# Patient Record
Sex: FEMALE | Race: BLACK OR AFRICAN AMERICAN
[De-identification: names, ages, dates, MRNs, and addresses within clinical notes are randomized per-mention and may not be internally consistent; named-entity substitution may affect disease eponyms.]

---

## 2020-01-30 ENCOUNTER — HOSPITAL ENCOUNTER (EMERGENCY)
Dept: HOSPITAL 62 - ER | Age: 55
Discharge: HOME | End: 2020-01-30
Payer: OTHER GOVERNMENT

## 2020-01-30 VITALS — DIASTOLIC BLOOD PRESSURE: 88 MMHG | SYSTOLIC BLOOD PRESSURE: 132 MMHG

## 2020-01-30 DIAGNOSIS — M54.42: Primary | ICD-10-CM

## 2020-01-30 PROCEDURE — 99283 EMERGENCY DEPT VISIT LOW MDM: CPT

## 2020-01-30 PROCEDURE — 96372 THER/PROPH/DIAG INJ SC/IM: CPT

## 2020-01-30 NOTE — ER DOCUMENT REPORT
HPI





- HPI


Patient complains to provider of: Low back pain radiating down the left leg


Time Seen by Provider: 01/30/20 12:30


Pain Level: 4


Notes: 





54-year-old female to the emergency department with complaints of low back pain 

has been radiating down her leg since earlier today.  She states she is she has 

a history of low back pain and sciatica but is been sometime since she had a 

flare.  She states that she was walking up the steps into her home this evening 

when she suddenly developed pain with radiculopathy.  She denies any bladder or 

bowel incontinence, saddle paresthesia, fevers, IV drug use.  She states that 

she is in pain management for her neck.  She states that she takes Botox 

injections and steroid injections.  She does not use any narcotic pain medicine.

 She states that [facility]

[facility].





- ROS


Systems Reviewed and Negative: Yes All other systems reviewed and negative





- CONSTITUTIONAL


Constitutional: DENIES: Fever, Chills





- EENT


EENT: DENIES: Sore Throat, Ear Pain, Nasal Drainage-Clear, Nasal Drainage-

Purulent, Congestion, Eye problems





- NEURO


Neurology: DENIES: Headache





- CARDIOVASCULAR


Cardiovascular: DENIES: Chest pain





- RESPIRATORY


Respiratory: DENIES: Trouble Breathing, Coughing





- GASTROINTESTINAL


Gastrointestinal: DENIES: Abdominal Pain, Nausea, Patient vomiting, Diarrhea





- REPRODUCTIVE


Reproductive: DENIES: Pregnant:





- MUSCULOSKELETAL


Musculoskeletal: REPORTS: Extremity pain, Back Pain.  DENIES: Neck Pain





- DERM


Skin Color: Normal


Skin Problems: None





Past Medical History





- General


Information source: Patient





- Social History


Smoking Status: Never Smoker


Chew tobacco use (# tins/day): No


Frequency of alcohol use: Occasional


Drug Abuse: None


Lives with: Spouse/Significant other


Family History: Reviewed & Not Pertinent


Patient has suicidal ideation: No


Patient has homicidal ideation: No





Vertical Provider Document





- CONSTITUTIONAL


Agree With Documented VS: Yes


Exam Limitations: No Limitations


General Appearance: WD/WN, No Apparent Distress





- INFECTION CONTROL


TRAVEL OUTSIDE OF THE [place] IN LAST 30 DAYS: No





- HEENT


HEENT: Atraumatic, Normal ENT Exam, Normocephalic, PERRLA





- NECK


Neck: Normal Inspection, Supple





- RESPIRATORY


Respiratory: Breath Sounds Normal, No Respiratory Distress.  negative: Rales, 

Rhonchi, Wheezing





- CARDIOVASCULAR


Cardiovascular: Regular Rate, Regular Rhythm, No Murmur





- GI/ABDOMEN


Gastrointestinal: Abdomen Soft, Abdomen Non-Tender, No Organomegaly





- BACK


Notes: 





There is tenderness to palpation over the midline lumbar spine and bilateral 

lumbosacral areas.  Negative straight leg raise bilaterally.  Nontender to 

palpation over the midline cervical and thoracic spine.  There is no step-off or

deformity.





- MUSCULOSKELETAL/EXTREMETIES


Musculoskeletal/Extremeties: FROM, Non-Tender





- NEURO


Level of Consciousness: Awake, Alert


Motor/Sensory: No Motor Deficit, No Sensory Deficit, No Pronator Drift





- DERM


Integumentary: Warm, Dry, No Rash





Course





- Re-evaluation


Re-evalutation: 





02/01/20 


Impression: Acute flare of chronic sciatica.  Will send patient home with pain 

medicines and muscle relaxants.  We will have her follow-up with her primary 

care physician.  Patient agrees with the plan.








- Vital Signs


Vital signs: 


                                        











Temp Pulse Resp BP Pulse Ox


 


 98.1 F   88   16   132/88 H  97 


 


 01/30/20 12:00  01/30/20 12:00  01/30/20 12:00  01/30/20 12:00 01/30/20 12:00














Discharge





- Discharge


Clinical Impression: 


Sciatica


Qualifiers:


 Laterality: left Qualified Code(s): M54.32 - Sciatica, left side





Low back pain


Qualifiers:


 Chronicity: acute Back pain laterality: midline Sciatica presence: with 

sciatica Sciatica laterality: sciatica of left side Qualified Code(s): M54.42 - 

Lumbago with sciatica, left side





Condition: Stable


Disposition: HOME, SELF-CARE


Instructions:  Ice Packs ([facility]), Sciatica ([facility])


Additional Instructions: 


[facility].  Take medicine as prescribed.  Return if 

any worsening symptoms such as loss of bladder or bowel control, fevers, 

numbness and tingling around rectum or vagina.  Push fluids.  Gentle stretching.


Prescriptions: 


Cyclobenzaprine HCl [Flexeril 10 mg Tablet] 10 mg PO TID #20 tablet


Methylprednisolone [Medrol Dosepack (4 mg/Tab) 21 Tab/Dosepak] 4 mg PO ASDIR PRN

#21 tab.ds.pk


 PRN Reason: 


Hydrocodone/Acetaminophen [Norco 5-325 mg Tablet] 1 tab PO Q6H #6 tablet


Referrals: 


[facility] [[facility]] - Follow up as needed

## 2020-07-10 ENCOUNTER — HOSPITAL ENCOUNTER (EMERGENCY)
Dept: HOSPITAL 62 - ER | Age: 55
Discharge: HOME | End: 2020-07-10
Payer: OTHER GOVERNMENT

## 2020-07-10 VITALS — SYSTOLIC BLOOD PRESSURE: 124 MMHG | DIASTOLIC BLOOD PRESSURE: 82 MMHG

## 2020-07-10 DIAGNOSIS — J01.00: Primary | ICD-10-CM

## 2020-07-10 DIAGNOSIS — R42: ICD-10-CM

## 2020-07-10 DIAGNOSIS — Z88.2: ICD-10-CM

## 2020-07-10 DIAGNOSIS — Z88.0: ICD-10-CM

## 2020-07-10 LAB
ADD MANUAL DIFF: NO
ALBUMIN SERPL-MCNC: 4.4 G/DL (ref 3.5–5)
ALP SERPL-CCNC: 78 U/L (ref 38–126)
ANION GAP SERPL CALC-SCNC: 8 MMOL/L (ref 5–19)
APPEARANCE UR: CLEAR
APTT PPP: YELLOW S
AST SERPL-CCNC: 25 U/L (ref 14–36)
BASOPHILS # BLD AUTO: 0 10^3/UL (ref 0–0.2)
BASOPHILS NFR BLD AUTO: 0.5 % (ref 0–2)
BILIRUB DIRECT SERPL-MCNC: 0 MG/DL (ref 0–0.4)
BILIRUB SERPL-MCNC: 0.2 MG/DL (ref 0.2–1.3)
BILIRUB UR QL STRIP: NEGATIVE
BUN SERPL-MCNC: 15 MG/DL (ref 7–20)
CALCIUM: 9.6 MG/DL (ref 8.4–10.2)
CHLORIDE SERPL-SCNC: 108 MMOL/L (ref 98–107)
CO2 SERPL-SCNC: 24 MMOL/L (ref 22–30)
EOSINOPHIL # BLD AUTO: 0.1 10^3/UL (ref 0–0.6)
EOSINOPHIL NFR BLD AUTO: 3.5 % (ref 0–6)
ERYTHROCYTE [DISTWIDTH] IN BLOOD BY AUTOMATED COUNT: 13.1 % (ref 11.5–14)
GLUCOSE SERPL-MCNC: 96 MG/DL (ref 75–110)
GLUCOSE UR STRIP-MCNC: NEGATIVE MG/DL
HCT VFR BLD CALC: 38.7 % (ref 36–47)
HGB BLD-MCNC: 12.7 G/DL (ref 12–15.5)
KETONES UR STRIP-MCNC: NEGATIVE MG/DL
LYMPHOCYTES # BLD AUTO: 1.3 10^3/UL (ref 0.5–4.7)
LYMPHOCYTES NFR BLD AUTO: 36.5 % (ref 13–45)
MCH RBC QN AUTO: 26.3 PG (ref 27–33.4)
MCHC RBC AUTO-ENTMCNC: 32.8 G/DL (ref 32–36)
MCV RBC AUTO: 80 FL (ref 80–97)
MONOCYTES # BLD AUTO: 0.3 10^3/UL (ref 0.1–1.4)
MONOCYTES NFR BLD AUTO: 9.6 % (ref 3–13)
NEUTROPHILS # BLD AUTO: 1.8 10^3/UL (ref 1.7–8.2)
NEUTS SEG NFR BLD AUTO: 49.9 % (ref 42–78)
NITRITE UR QL STRIP: NEGATIVE
PH UR STRIP: 5 [PH] (ref 5–9)
PLATELET # BLD: 266 10^3/UL (ref 150–450)
POTASSIUM SERPL-SCNC: 4.3 MMOL/L (ref 3.6–5)
PROT SERPL-MCNC: 7.6 G/DL (ref 6.3–8.2)
PROT UR STRIP-MCNC: NEGATIVE MG/DL
RBC # BLD AUTO: 4.84 10^6/UL (ref 3.72–5.28)
SP GR UR STRIP: 1.03
TOTAL CELLS COUNTED % (AUTO): 100 %
UROBILINOGEN UR-MCNC: NEGATIVE MG/DL (ref ?–2)
WBC # BLD AUTO: 3.6 10^3/UL (ref 4–10.5)

## 2020-07-10 PROCEDURE — 85025 COMPLETE CBC W/AUTO DIFF WBC: CPT

## 2020-07-10 PROCEDURE — 70450 CT HEAD/BRAIN W/O DYE: CPT

## 2020-07-10 PROCEDURE — 81001 URINALYSIS AUTO W/SCOPE: CPT

## 2020-07-10 PROCEDURE — 80053 COMPREHEN METABOLIC PANEL: CPT

## 2020-07-10 PROCEDURE — 99284 EMERGENCY DEPT VISIT MOD MDM: CPT

## 2020-07-10 PROCEDURE — 36415 COLL VENOUS BLD VENIPUNCTURE: CPT

## 2020-07-10 NOTE — ER DOCUMENT REPORT
ED Dizziness/Weakness





- General


Chief Complaint: Dizziness


Stated Complaint: DIZZINESS


Time Seen by Provider: 07/10/20 16:54


Primary Care Provider: 


[facility] [[facility]] - Follow up as needed


Information source: Patient


TRAVEL OUTSIDE OF THE [place] IN LAST 30 DAYS: No





- HPI


Patient complains to provider of: Vertigo - This is a 55-year-old female who is 

had some intermittent vertigo for about 6 weeks which is gotten much worse over 

the last couple of days.  She called her doctor to be seen he advised her to 

come to the hospital since it was Friday evening and she would not build to be 

seen until Monday.  She has no nausea vomiting she is awake alert oriented to 

person place time and event.  She has no neurological deficits..  No: Altered 

mental status





- Related Data


Allergies/Adverse Reactions: 


                                        





amoxicillin Allergy (Verified 07/10/20 16:55)


   


baclofen Allergy (Verified 07/10/20 16:55)


   


Sulfa (Sulfonamide Antibiotics) Allergy (Verified 07/10/20 16:55)


   


sumatriptan Allergy (Verified 07/10/20 16:55)


   











Past Medical History





- General


Information source: Patient





- Social History


Smoking Status: Never Smoker


Chew tobacco use (# tins/day): No


Frequency of alcohol use: None


Drug Abuse: None


Family History: Reviewed & Not Pertinent


Patient has homicidal ideation: No





Physical Exam





- Vital signs


Vitals: 





                                        











Temp Pulse Resp BP Pulse Ox


 


 99.1 F   108 H  18   124/82   100 


 


 07/10/20 16:52  07/10/20 16:52  07/10/20 16:52  07/10/20 16:52  07/10/20 16:52











Interpretation: Normal





- General


General appearance: Appears well, Alert





- HEENT


Head: Normocephalic, Atraumatic


Eyes: Normal


Pupils: PERRL





- Respiratory


Respiratory status: No respiratory distress


Chest status: Nontender


Breath sounds: Normal


Chest palpation: Normal





- Cardiovascular


Rhythm: Regular


Heart sounds: Normal auscultation


Murmur: No





- Abdominal


Inspection: Normal


Distension: No distension


Bowel sounds: Normal


Tenderness: Nontender


Organomegaly: No organomegaly





- Back


Back: Normal, Nontender





- Extremities


General upper extremity: Normal inspection, Nontender, Normal color, Normal ROM,

Normal temperature


General lower extremity: Normal inspection, Nontender, Normal color, Normal ROM,

Normal temperature, Normal weight bearing.  No: Balbir's sign





- Neurological


Neuro grossly intact: Yes


Cognition: Normal


Orientation: AAOx4


[place] Coma Scale Eye Opening: Spontaneous


[place] Coma Scale Verbal: Oriented


[place] Coma Scale Motor: Obeys Commands


[place] Coma Scale Total: 15


Speech: Normal


Motor strength normal: LUE, RUE, LLE, RLE


Sensory: Normal





- Psychological


Associated symptoms: Normal affect, Normal mood





- Skin


Skin Temperature: Warm


Skin Moisture: Dry


Skin Color: Normal





Course





- Re-evaluation


Re-evalutation: 





07/10/20 20:39


Patient does state she has had some postnasal drip.


The last 3 weeks.





- Vital Signs


Vital signs: 





                                        











Temp Pulse Resp BP Pulse Ox


 


 99.1 F   108 H  18   124/82   100 


 


 07/10/20 16:52  07/10/20 16:52  07/10/20 16:52  07/10/20 16:52  07/10/20 16:52














- Laboratory


Result Diagrams: 


                                 07/10/20 17:05





                                 07/10/20 17:05


Laboratory results interpreted by me: 





                                        











  07/10/20 07/10/20





  17:05 17:05


 


WBC  3.6 L 


 


MCH  26.3 L 


 


Chloride   108 H











07/10/20 20:39





                              Labs- All tests 24 hr











  07/10/20 07/10/20 07/10/20





  17:05 17:05 17:05


 


WBC  3.6 L  


 


RBC  4.84  


 


Hgb  12.7  


 


Hct  38.7  


 


MCV  80  


 


MCH  26.3 L  


 


MCHC  32.8  


 


RDW  13.1  


 


Plt Count  266  


 


Lymph % (Auto)  36.5  


 


Mono % (Auto)  9.6  


 


Eos % (Auto)  3.5  


 


Baso % (Auto)  0.5  


 


Absolute Neuts (auto)  1.8  


 


Absolute Lymphs (auto)  1.3  


 


Absolute Monos (auto)  0.3  


 


Absolute Eos (auto)  0.1  


 


Absolute Basos (auto)  0.0  


 


Seg Neutrophils %  49.9  


 


Sodium   140.0 


 


Potassium   4.3 


 


Chloride   108 H 


 


Carbon Dioxide   24 


 


Anion Gap   8 


 


BUN   15 


 


Creatinine   0.70 


 


Est GFR ( Amer)   > 60 


 


Est GFR (MDRD) Non-Af   > 60 


 


Glucose   96 


 


Calcium   9.6 


 


Total Bilirubin   0.2 


 


Direct Bilirubin   0.0 


 


Neonat Total Bilirubin   Not Reportable 


 


Neonat Direct Bilirubin   Not Reportable 


 


Neonat Indirect Bili   Not Reportable 


 


AST   25 


 


ALT   19 


 


Alkaline Phosphatase   78 


 


Total Protein   7.6 


 


Albumin   4.4 


 


Urine Color    YELLOW


 


Urine Appearance    CLEAR


 


Urine pH    5.0


 


Ur Specific Gravity    1.027


 


Urine Protein    NEGATIVE


 


Urine Glucose (UA)    NEGATIVE


 


Urine Ketones    NEGATIVE


 


Urine Blood    NEGATIVE


 


Urine Nitrite    NEGATIVE


 


Urine Bilirubin    NEGATIVE


 


Urine Urobilinogen    NEGATIVE


 


Ur Leukocyte Esterase    NEGATIVE


 


Urine WBC (Auto)    1


 


Urine RBC (Auto)    1


 


Squamous Epi Cells Auto    <1


 


Calcium Oxalate Cr Auto    MODERATE


 


Urine Mucus (Auto)    OCC


 


Urine Ascorbic Acid    NEGATIVE














- Diagnostic Test


Radiology results interpreted by me: 





07/10/20 20:39





                                        





Head CT  07/10/20 16:54


IMPRESSION:  NO ACUTE INTRACRANIAL IMAGING FINDINGS.  RIGHT MAXILLARY SINUS 

DISEASE.


EVIDENCE OF ACUTE STROKE: NO.


 














Discharge





- Discharge


Clinical Impression: 


 Vertigo





Sinusitis nasal


Qualifiers:


 Sinusitis location: maxillary Chronicity: acute Recurrence: not specified as 

recurrent Qualified Code(s): J01.00 - Acute maxillary sinusitis, unspecified





Disposition: HOME, SELF-CARE


Instructions:  Vertigo ([facility]), Meclizine ([facility])


Additional Instructions: 


Sinusitis





     You have sinusitis, an infection of the sinus cavities of the face.  The 

sinuses are air-filled chambers which open into the inside of the nose.  

Bacteria and pus fill a sinus, causing pain, drainage, and fever.


     Sinusitis is treated with antibiotics.  Often, expectorants (to thin the 

sinus mucous) or decongestants (to reduce swelling) are prescribed as well.  

Healing requires seven to 10 days.


     Avoid chemical fumes, pollens, dusts, and smoke (especially cigarette 

smoke).  Keep the air humidified in your bedroom and work area and take plenty 

of liquids by mouth.


     This condition can be serious if the infection spreads.  If your symptoms 

worsen, or if you develop severe headache, high fever, stiff neck, or a rash, 

you must call the doctor or return for re-evaluation.





Prescriptions: 


Meclizine HCl [Antivert 25 mg Tablet] 25 mg PO TID PRN #21 tablet


 PRN Reason: 


Clindamycin HCl 300 mg PO TID #30 capsule


Referrals: 


[facility] [[facility]] - Follow up as needed

## 2020-07-10 NOTE — RADIOLOGY REPORT (SQ)
EXAM DESCRIPTION:  CT HEAD WITHOUT



IMAGES COMPLETED DATE/TIME:  7/10/2020 5:37 pm



REASON FOR STUDY:  vertigo



COMPARISON:  None.



TECHNIQUE:  Axial images acquired through the brain without intravenous contrast.  Images reviewed wi
th bone, brain and subdural windows.  Additional sagittal and coronal reconstructions were generated.
 Images stored on PACS.

All CT scanners at this facility use dose modulation, iterative reconstruction, and/or weight based d
osing when appropriate to reduce radiation dose to as low as reasonably achievable (ALARA).

[facility]: Dose Right  [facility]: CareDose    [facility]: Dose Right    [facility]: Teradose 4D    [facility]: Smart Festicket



RADIATION DOSE:  CT Rad equipment meets quality standard of care and radiation dose reduction techniq
ues were employed. CTDIvol: 53.2 mGy. DLP: 991 mGy-cm. mGy.



LIMITATIONS:  None.



FINDINGS:  VENTRICLES: Normal size and contour.

CEREBRUM: No masses.  No hemorrhage.  No midline shift.  No evidence for acute infarction. Normal gra
y/white matter differentiation. No areas of low density in the white matter.

CEREBELLUM: No masses.  No hemorrhage.  No alteration of density.  No evidence for acute infarction.

EXTRAAXIAL SPACES: No fluid collections.  No masses.

ORBITS AND GLOBE: No intra- or extraconal masses.  Normal contour of globe without masses.

CALVARIUM: No fracture.

PARANASAL SINUSES: Mucous retention cyst in the right maxillary sinus.

SOFT TISSUES: No mass or hematoma.

OTHER: No other significant finding.



IMPRESSION:  NO ACUTE INTRACRANIAL IMAGING FINDINGS.  RIGHT MAXILLARY SINUS DISEASE.

EVIDENCE OF ACUTE STROKE: NO.



COMMENT:  Quality ID # [id number]: Final reports with documentation of one or more dose reduction techniques
 (e.g., Automated exposure control, adjustment of the mA and/or kV according to patient size, use of 
iterative reconstruction technique)



TECHNICAL DOCUMENTATION:  JOB ID:  [id number]

 2011 [employer]- All Rights Reserved



Reading location - IP/workstation name: [email or web address]

## 2020-07-10 NOTE — ER DOCUMENT REPORT
ED Medical Screen (RME)





- General


Chief Complaint: Dizziness


Stated Complaint: DIZZINESS


Time Seen by Provider: 07/10/20 16:54


Primary Care Provider: 


JAGUAR NO [Primary Care Provider] - Follow up as needed


Information source: Patient


Notes: 





This 55-year-old female who is had bouts of vertigo intermittently over the last

6 months but they have greatly increased over the last 2 days.  She called her 

private doctor who advised her to come into the emergency room since were coming

up to a week and.


TRAVEL OUTSIDE OF THE [place] IN LAST 30 DAYS: No





- Related Data


Allergies/Adverse Reactions: 


                                        





amoxicillin Allergy (Verified 01/30/20 12:29)


   


baclofen Allergy (Verified 01/30/20 12:29)


   


Sulfa (Sulfonamide Antibiotics) Allergy (Verified 01/30/20 12:34)


   


sumatriptan Allergy (Verified 01/30/20 12:29)


   











Physical Exam





- Vital signs


Vitals: 





                                        











Temp Pulse Resp BP Pulse Ox


 


 99.1 F   108 H  18   124/82   100 


 


 07/10/20 16:52  07/10/20 16:52  07/10/20 16:52  07/10/20 16:52  07/10/20 16:52














Course





- Vital Signs


Vital signs: 





                                        











Temp Pulse Resp BP Pulse Ox


 


 99.1 F   108 H  18   124/82   100 


 


 07/10/20 16:52  07/10/20 16:52  07/10/20 16:52  07/10/20 16:52  07/10/20 16:52














Doctor's Discharge





- Discharge


Referrals: 


[facility] [[facility]] - Follow up as needed